# Patient Record
Sex: MALE | Race: WHITE
[De-identification: names, ages, dates, MRNs, and addresses within clinical notes are randomized per-mention and may not be internally consistent; named-entity substitution may affect disease eponyms.]

---

## 2020-01-09 ENCOUNTER — HOSPITAL ENCOUNTER (EMERGENCY)
Dept: HOSPITAL 94 - ER | Age: 40
Discharge: HOME | End: 2020-01-09
Payer: COMMERCIAL

## 2020-01-09 VITALS — WEIGHT: 210.43 LBS | BODY MASS INDEX: 28.5 KG/M2 | HEIGHT: 72 IN

## 2020-01-09 VITALS — SYSTOLIC BLOOD PRESSURE: 188 MMHG | DIASTOLIC BLOOD PRESSURE: 117 MMHG

## 2020-01-09 DIAGNOSIS — T17.228A: Primary | ICD-10-CM
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
## 2020-01-09 PROCEDURE — 96375 TX/PRO/DX INJ NEW DRUG ADDON: CPT

## 2020-01-09 PROCEDURE — 99283 EMERGENCY DEPT VISIT LOW MDM: CPT

## 2020-01-09 PROCEDURE — 96374 THER/PROPH/DIAG INJ IV PUSH: CPT

## 2020-01-09 PROCEDURE — 70360 X-RAY EXAM OF NECK: CPT

## 2020-01-09 NOTE — NUR
PT JUST COUGHED, AND WAS ABLE TO CLEAR THE FOOD BOLUS.  HE IS ABLE TO SWALLOW 
WATER AND STATES IT FEELS LIKE IT HAS CLEARED.  MD AWARE AND GI TEAM 
RECONTACTED.

## 2020-01-09 NOTE — NUR
pt was given zofran and glucoagon.  He is still having trouble swallowing 
secretions.  He does state he was able to burp.  GI will be coming for patient 
soon.

## 2023-05-04 ENCOUNTER — HOSPITAL ENCOUNTER (OUTPATIENT)
Dept: HOSPITAL 94 - RAD | Age: 43
Discharge: HOME | End: 2023-05-04
Attending: NURSE PRACTITIONER
Payer: COMMERCIAL

## 2023-05-04 DIAGNOSIS — J32.1: ICD-10-CM

## 2023-05-04 DIAGNOSIS — J32.0: Primary | ICD-10-CM

## 2023-05-04 DIAGNOSIS — J32.3: ICD-10-CM

## 2023-05-04 PROCEDURE — 70486 CT MAXILLOFACIAL W/O DYE: CPT
